# Patient Record
Sex: FEMALE | Race: WHITE | NOT HISPANIC OR LATINO | Employment: UNEMPLOYED | URBAN - METROPOLITAN AREA
[De-identification: names, ages, dates, MRNs, and addresses within clinical notes are randomized per-mention and may not be internally consistent; named-entity substitution may affect disease eponyms.]

---

## 2023-01-10 ENCOUNTER — HOSPITAL ENCOUNTER (EMERGENCY)
Facility: MEDICAL CENTER | Age: 78
End: 2023-01-10
Attending: EMERGENCY MEDICINE
Payer: MEDICAID

## 2023-01-10 ENCOUNTER — APPOINTMENT (OUTPATIENT)
Dept: RADIOLOGY | Facility: MEDICAL CENTER | Age: 78
End: 2023-01-10
Attending: EMERGENCY MEDICINE
Payer: MEDICAID

## 2023-01-10 VITALS
SYSTOLIC BLOOD PRESSURE: 127 MMHG | RESPIRATION RATE: 18 BRPM | DIASTOLIC BLOOD PRESSURE: 70 MMHG | HEART RATE: 77 BPM | WEIGHT: 132.28 LBS | TEMPERATURE: 98.3 F | OXYGEN SATURATION: 94 %

## 2023-01-10 DIAGNOSIS — S09.90XA CLOSED HEAD INJURY, INITIAL ENCOUNTER: ICD-10-CM

## 2023-01-10 PROCEDURE — 70450 CT HEAD/BRAIN W/O DYE: CPT

## 2023-01-10 PROCEDURE — 700102 HCHG RX REV CODE 250 W/ 637 OVERRIDE(OP): Performed by: EMERGENCY MEDICINE

## 2023-01-10 PROCEDURE — 72125 CT NECK SPINE W/O DYE: CPT

## 2023-01-10 PROCEDURE — A9270 NON-COVERED ITEM OR SERVICE: HCPCS | Performed by: EMERGENCY MEDICINE

## 2023-01-10 PROCEDURE — 99284 EMERGENCY DEPT VISIT MOD MDM: CPT

## 2023-01-10 RX ORDER — ACETAMINOPHEN 325 MG/1
650 TABLET ORAL ONCE
Status: COMPLETED | OUTPATIENT
Start: 2023-01-10 | End: 2023-01-10

## 2023-01-10 RX ORDER — LOSARTAN POTASSIUM 25 MG/1
25 TABLET ORAL DAILY
COMMUNITY

## 2023-01-10 RX ADMIN — ACETAMINOPHEN 650 MG: 325 TABLET ORAL at 11:57

## 2023-01-10 ASSESSMENT — PAIN DESCRIPTION - PAIN TYPE: TYPE: ACUTE PAIN

## 2023-01-10 NOTE — ED NOTES
Discharge orders received. Discharge instructions provided by ERP. AVS provided and reviewed with patient. Patient AOx4 and ambulatory. No IV placed during ED visit. Patient discharged to family without incident.

## 2023-01-10 NOTE — ED TRIAGE NOTES
Pt to triage via w/c c/o trip fall down 2 stairs in her home yesterday now with head and neck pain. -loc - blood thinners. Pt has c-collar in place. nad

## 2023-01-10 NOTE — ED PROVIDER NOTES
ED Provider Note    CHIEF COMPLAINT  Chief Complaint   Patient presents with    Fall         HPI/ROS  LIMITATION TO HISTORY   Select: : None  OUTSIDE HISTORIAN(S):  Select: Family at the bedside who augments the history    Opal Solorzano is a 77 y.o. female who presents complaining of a headache and neck pain.  The patient slipped and fell down 2 stairs yesterday.  She had quite a bit of swelling.  That is better with some ice.  However family still concerned and finally encouraged her today to come to the hospital to get care.  She is not on any antiplatelet or anticoagulant medications.  There is no loss of consciousness.  She denies pain elsewhere.  There is no chest pain or shortness of breath.  No belly pain.  No weakness or numbness per no nausea or vomiting.  There is no other complaint.    PAST MEDICAL HISTORY   has a past medical history of Cancer (HCC) and Hypertension.    SURGICAL HISTORY  patient denies any surgical history    FAMILY HISTORY  History reviewed. No pertinent family history.    SOCIAL HISTORY  Social History     Tobacco Use    Smoking status: Never    Smokeless tobacco: Never   Substance and Sexual Activity    Alcohol use: Not Currently    Drug use: Never    Sexual activity: Not on file       CURRENT MEDICATIONS  Home Medications       Reviewed by Latanya Bethea R.N. (Registered Nurse) on 01/10/23 at 1103  Med List Status: Partial     Medication Last Dose Status   losartan (COZAAR) 25 MG Tab  Active                    ALLERGIES  No Known Allergies    PHYSICAL EXAM  VITAL SIGNS: /67   Pulse 87   Temp 36.4 °C (97.6 °F) (Temporal)   Resp 19   Wt 60 kg (132 lb 4.4 oz)   SpO2 97%    Constitutional: Well appearing patient in no acute distress.  HENT: Head is notable for a hematoma over the right upper scalp near the vertex.  This is tender.  No clinical sign of skull fracture.  Eyes: Sclerae are nonicteric, pupils are equally round.  Neck: There is no discrete midline  tenderness step-off or deformity.  Seems mostly paraspinal musculature which is tender.  Subsequently found to be supple with grossly normal range of motion.  Cardiovascular: Heart is regular rate and rhythm without murmur rub or gallop.  Peripheral pulses are intact and symmetric throughout.  Thorax & Lungs: Breathing easily.  Good air movement.  There is no wheeze, rhonchi or rales.  Abdomen: Bowel sounds normal, soft, non-distended, nontender, no mass nor pulsatile mass. do not appreciate hepatosplenomegaly.  Skin: No apparent rash.  I do not see petechiae or purpura.  Extremities: No evidence of acute trauma.  No clubbing, cyanosis, edema, no Homans or cords.  Neurologic: Alert. Moving all extremities.  Intact strength throughout.  Gait is normal.  Psychiatric: Normal for situation      DIAGNOSTIC STUDIES / PROCEDURES      RADIOLOGY  I have independently interpreted the diagnostic imaging associated with this visit and am waiting the final reading from the radiologist.   CT-CSPINE WITHOUT PLUS RECONS   Final Result      No acute abnormality identified.      CT-HEAD W/O   Final Result      1.  No evidence of intracranial hemorrhage   2.  Scalp hematoma   3.  Atrophy   4.  White matter changes               COURSE & MEDICAL DECISION MAKING    ED Observation Status? Yes; I am placing the patient in to an observation status due to a diagnostic uncertainty as well as therapeutic intensity. Patient placed in observation status at 11:53 AM, 1/10/2023.  We will be proceeding with CT scanning, Tylenol and reevaluation of her neck afterwards.    INITIAL ASSESSMENT AND PLAN  Care Narrative: Presents a day out from a fall.  I am concerned because of her age and the cephalhematoma that she has.  Even though she has not any anticoagulant or antiplatelet medication, I still think that she needs imaging of her brain.  Similarly, although my suspicion is more for neck muscle pain, given her age and the mechanism further we need  to do a CT to ensure there is no fracture.  Patient family agree with this, we will proceed.  In the interim, she was given Tylenol.    CT returns is negative for intracranial hemorrhage in her head, or fracture dislocation or neck.  Upon recheck she is feeling better.  At this point, we will discharge her home in the care of family.  Instructions on scalp hematoma.  HTN/IDDM FOLLOW UP:  The patient has known hypertension and is being followed by their primary care doctor        FINAL DIAGNOSIS  1. Closed head injury, initial encounter    .  Mechanical ground-level fall       Electronically signed by: Jake Biggs M.D., 1/10/2023 11:51 AM

## 2023-01-10 NOTE — ED NOTES
Patient wheeled to TCS 22 without incident. Patient oriented to care area. Primary assessment complete. Chart up for ERP.

## 2025-07-23 ENCOUNTER — HOSPITAL ENCOUNTER (OUTPATIENT)
Dept: RADIOLOGY | Facility: MEDICAL CENTER | Age: 80
End: 2025-07-23
Attending: NURSE PRACTITIONER
Payer: MEDICAID

## 2025-07-23 DIAGNOSIS — R10.32 LEFT LOWER QUADRANT ABDOMINAL PAIN: ICD-10-CM
